# Patient Record
Sex: MALE | Race: WHITE | NOT HISPANIC OR LATINO | URBAN - METROPOLITAN AREA
[De-identification: names, ages, dates, MRNs, and addresses within clinical notes are randomized per-mention and may not be internally consistent; named-entity substitution may affect disease eponyms.]

---

## 2021-04-07 ENCOUNTER — NURSE TRIAGE (OUTPATIENT)
Dept: OTHER | Facility: OTHER | Age: 61
End: 2021-04-07

## 2021-04-07 DIAGNOSIS — Z20.822 EXPOSURE TO COVID-19 VIRUS: ICD-10-CM

## 2021-04-07 DIAGNOSIS — Z20.822 EXPOSURE TO COVID-19 VIRUS: Primary | ICD-10-CM

## 2021-04-07 PROCEDURE — U0005 INFEC AGEN DETEC AMPLI PROBE: HCPCS | Performed by: FAMILY MEDICINE

## 2021-04-07 PROCEDURE — U0003 INFECTIOUS AGENT DETECTION BY NUCLEIC ACID (DNA OR RNA); SEVERE ACUTE RESPIRATORY SYNDROME CORONAVIRUS 2 (SARS-COV-2) (CORONAVIRUS DISEASE [COVID-19]), AMPLIFIED PROBE TECHNIQUE, MAKING USE OF HIGH THROUGHPUT TECHNOLOGIES AS DESCRIBED BY CMS-2020-01-R: HCPCS | Performed by: FAMILY MEDICINE

## 2021-04-07 NOTE — TELEPHONE ENCOUNTER
Reason for Disposition   [1] COVID-19 infection suspected by caller or triager AND [2] mild symptoms (cough, fever, or others) AND [9] no complications or SOB    Answer Assessment - Initial Assessment Questions  1  Were you within 6 feet or less, for up to 15 minutes or more with a person that has a confirmed COVID-19 test?   Denies     2  What was the date of your exposure? Denies     3  Are you experiencing any symptoms attributed to the virus?  (Assess for SOB, cough, fever, difficulty breathing)   Fatigue, fever 100 this morning, body aches, dry and sore throat, upset stomach      4  HIGH RISK: Do you have any history heart or lung conditions, weakened immune system, diabetes, Asthma, CHF, HIV, COPD, Chemo, renal failure, sickle cell, etc?   [place]ipass, elevated BP    Protocols used: CORONAVIRUS (COVID-19) DIAGNOSED OR SUSPECTED-ADULT-[place]

## 2021-04-07 NOTE — TELEPHONE ENCOUNTER
Regarding: Covid Test  Symptomatic   ----- Message from Octavio Knapp sent at 4/7/2021  3:32 PM EDT -----  Pt called experiencing COVID like symptoms  " I am calling so I can get tested for COVID  I was not exposed to anyone that has COVID but I do have symptoms  I am feeling very fatigue, tired, and sore   My temperature was on 100 this morning, but has decreased"

## 2021-04-08 LAB — SARS-COV-2 RNA RESP QL NAA+PROBE: NEGATIVE

## 2021-05-10 ENCOUNTER — TELEPHONE (OUTPATIENT)
Dept: SURGERY | Facility: CLINIC | Age: 61
End: 2021-05-10

## 2021-05-10 DIAGNOSIS — G89.29 CHRONIC BILATERAL LOW BACK PAIN, UNSPECIFIED WHETHER SCIATICA PRESENT: Primary | ICD-10-CM

## 2021-05-10 DIAGNOSIS — M54.50 CHRONIC BILATERAL LOW BACK PAIN, UNSPECIFIED WHETHER SCIATICA PRESENT: Primary | ICD-10-CM

## 2021-05-10 NOTE — TELEPHONE ENCOUNTER
Andrea was scheduled for 5/17/21 with Kiana and is aware  Xray is in the chart. Andrea [place] out so he will come in for his x ray at 1pm for [facility] and check in on 5th floor as soon as its completed

## 2021-05-10 NOTE — TELEPHONE ENCOUNTER
"  Did not schedule because not sure if Dr Luong what's to send him for a MRI or xray  Or if ok to set with Kiana. Please advise I will call him back         Who referred you to our office?  Answer: prior patient    Have you had spine surgery, including a spinal cord stimulator?  a. No >> move on to next question  b. Yes, with Dr. Luong >> move on to the next question  c. Yes, with another surgeon >> \"Since you have had prior surgery, the office requires you to drop off or mail in a recent (post-surgery) MRI or CT scan.  Dr. Luong will review the imaging study to determine if he is able to offer surgical options.  You are not guaranteed an appointment as Dr. Luong may not have a surgical solution for you; however, I am going to ask you a few more questions to get a better idea of how we can help you.\"  i. Kiana will not order images for these patients - they must get imaging from another provider (their prior surgeon, PCP, pain management, etc.)  Answer: ye sin 2019 by Dr Luong  Fusion for L3,4 and 5      Is your pain related to a worker's compensation injury or a recent (within 2 years) auto accident?  d. No or not a recent accident >> move on to the next question.  e. Yes >> Unfortunately, Dr. Luong does not accept these insurances.   i. No need to fill out additional questions, but please forward us the encounter with the above information.  Answer: no      Where is your pain located (neck (cervical), mid (thoracic), or low back (lumbar))?  f. If the patient has multiple areas of pain, please explain that Dr. Luong will only be able to focus on ONE area per visit  Answer:  Mid to lower back       Have you completed an MRI or CT scan of your spine within the last 18 months?  g. If yes, \"please bring the discs and reports of these images to your appointment.\" SCHEDULE WITH DR. LUONG  h. If no, \"I can schedule you an appointment with Kiana, Dr. Luong's physician assistant.  She evaluates patients who " "do not have MRIs or CTs to help start a treatment plan. If after your initial visit with Kiana, it is appropriate to see Dr. Luong, we will schedule accordingly.\" SCHEDULE WITH KIANA (make appointment length 60 minutes)  Answer: no      Have you completed x-rays of your spine within the last year?  i. If yes, please bring the discs and reports of these images to your appointment.  j. If no, the office will order an x-ray for you to complete prior to your visit.  Are you able to complete x-rays in [facility]? If not, please find out where they will be going so that we can fax the script there.  Remind them to bring the disc of their images!!   i. If patients need to get x-rays done prior to their appointment, book them out 7-10 days so they have time to get the images.    Answer: no    Does your pain radiate?   Answer: hips an legs     Do you have numbness or tingling?  Answer: yes    Do you have weakness?  Answer: yes    Have you completed physical therapy?  Answer:  Yrs still doing pt    Have you completed spinal injections?  Answer:no    ** Please forward all completed intake questions to [facility] Provider Pool.   ** After scheduling the patient, please remind patients that Dr. Luong cannot prescribe narcotic pain medication or perform epidural injections.   ** New patients must arrive 30 minutes prior to appt and follow-ups arrive 15 min prior for appt.  ** If you do not schedule a patient for an appointment, please give a reason for not scheduling!!    **In summary:  - No MRI or CT >> Kiana  - Has MRI or CT and NO prior surgery >> Dr. Luong  - Prior surgery with another surgeon >> mail or drop off recent images and we will get back to them    "

## 2021-05-17 ENCOUNTER — HOSPITAL ENCOUNTER (OUTPATIENT)
Dept: RADIOLOGY | Facility: CLINIC | Age: 61
Discharge: HOME | End: 2021-05-17
Attending: ORTHOPAEDIC SURGERY
Payer: COMMERCIAL

## 2021-05-17 ENCOUNTER — OFFICE VISIT (OUTPATIENT)
Dept: SURGERY | Facility: CLINIC | Age: 61
End: 2021-05-17
Payer: COMMERCIAL

## 2021-05-17 VITALS
WEIGHT: 245 LBS | SYSTOLIC BLOOD PRESSURE: 154 MMHG | DIASTOLIC BLOOD PRESSURE: 96 MMHG | HEIGHT: 64 IN | TEMPERATURE: 98 F | BODY MASS INDEX: 41.83 KG/M2 | HEART RATE: 76 BPM | OXYGEN SATURATION: 95 % | RESPIRATION RATE: 20 BRPM

## 2021-05-17 DIAGNOSIS — M48.062 LUMBAR STENOSIS WITH NEUROGENIC CLAUDICATION: Primary | ICD-10-CM

## 2021-05-17 DIAGNOSIS — G89.29 CHRONIC BILATERAL LOW BACK PAIN, UNSPECIFIED WHETHER SCIATICA PRESENT: ICD-10-CM

## 2021-05-17 DIAGNOSIS — M54.50 CHRONIC BILATERAL LOW BACK PAIN, UNSPECIFIED WHETHER SCIATICA PRESENT: ICD-10-CM

## 2021-05-17 DIAGNOSIS — Z98.1 S/P LUMBAR FUSION: ICD-10-CM

## 2021-05-17 PROBLEM — G47.33 OSA ON CPAP: Status: ACTIVE | Noted: 2019-12-02

## 2021-05-17 PROBLEM — E78.2 MIXED HYPERLIPIDEMIA: Status: ACTIVE | Noted: 2019-11-14

## 2021-05-17 PROBLEM — I25.10 CORONARY ARTERY DISEASE INVOLVING NATIVE CORONARY ARTERY OF NATIVE HEART WITHOUT ANGINA PECTORIS: Status: ACTIVE | Noted: 2017-07-07

## 2021-05-17 PROBLEM — Z95.1 S/P CABG X 4: Status: ACTIVE | Noted: 2019-11-14

## 2021-05-17 PROBLEM — I73.9 PAD (PERIPHERAL ARTERY DISEASE) (CMS/HCC): Status: ACTIVE | Noted: 2018-11-06

## 2021-05-17 PROBLEM — N18.30 CKD (CHRONIC KIDNEY DISEASE) STAGE 3, GFR 30-59 ML/MIN (CMS/HCC): Status: ACTIVE | Noted: 2019-12-05

## 2021-05-17 PROCEDURE — 99214 OFFICE O/P EST MOD 30 MIN: CPT | Performed by: PHYSICIAN ASSISTANT

## 2021-05-17 PROCEDURE — 3008F BODY MASS INDEX DOCD: CPT | Performed by: PHYSICIAN ASSISTANT

## 2021-05-17 PROCEDURE — 72100 X-RAY EXAM L-S SPINE 2/3 VWS: CPT

## 2021-05-17 RX ORDER — OMEPRAZOLE 40 MG/1
40 CAPSULE, DELAYED RELEASE ORAL
COMMUNITY

## 2021-05-17 RX ORDER — FLUOXETINE 10 MG/1
CAPSULE ORAL
COMMUNITY
Start: 2021-02-10

## 2021-05-17 RX ORDER — IBUPROFEN 100 MG/5ML
SUSPENSION, ORAL (FINAL DOSE FORM) ORAL
COMMUNITY

## 2021-05-17 RX ORDER — METOPROLOL TARTRATE 50 MG/1
TABLET ORAL
COMMUNITY
Start: 2021-05-04

## 2021-05-17 RX ORDER — EVOLOCUMAB 140 MG/ML
140 INJECTION, SOLUTION SUBCUTANEOUS
COMMUNITY
Start: 2021-05-03

## 2021-05-17 RX ORDER — METHYLPREDNISOLONE 4 MG/1
TABLET ORAL
Qty: 21 TABLET | Refills: 0 | Status: SHIPPED | OUTPATIENT
Start: 2021-05-17 | End: 2021-11-04

## 2021-05-17 RX ORDER — CETIRIZINE HYDROCHLORIDE 10 MG/1
10 TABLET ORAL
COMMUNITY

## 2021-05-17 RX ORDER — LORAZEPAM 0.5 MG/1
0.5 TABLET ORAL
COMMUNITY

## 2021-05-17 RX ORDER — SODIUM FLUORIDE 6.1 MG/ML
GEL, DENTIFRICE DENTAL
COMMUNITY
Start: 2021-05-04

## 2021-05-17 RX ORDER — LISINOPRIL AND HYDROCHLOROTHIAZIDE 20; 25 MG/1; MG/1
TABLET ORAL
COMMUNITY
Start: 2021-05-04

## 2021-05-17 RX ORDER — AZELASTINE HYDROCHLORIDE, FLUTICASONE PROPIONATE 137; 50 UG/1; UG/1
SPRAY, METERED NASAL
COMMUNITY
Start: 2021-02-10

## 2021-05-17 RX ORDER — ASPIRIN 81 MG/1
81 TABLET ORAL DAILY
COMMUNITY

## 2021-05-17 NOTE — LETTER
May 17, 2021     Luis Fallon Jr., MD  [address] [facility] [address]    Patient: Andrea Villegas  YOB: 1960  Date of Visit: 2021      Dear Dr. Fallon:    Thank you for referring Andrea Villegas to me for evaluation. Below are my notes for this consultation.    If you have questions, please do not hesitate to call me. I look forward to following your patient along with you.         Sincerely,        SUDHA Dunne        CC: No Recipients  Kiana Hernandez PA C  2021  2:55 PM  Signed  NAME: Andrea Villegas  : 1960  PCP: Luis Fallon Jr., MD      Chief Complaint: back and leg pain    HPI:  60 y.o. male presenting for initial visit with chief complaint of back and leg pain.  Pain began months ago. Describes pain as aching and throbbing in nature.  Located in low back, bilateral buttocks, posterior thighs to bilateral calves and feet.  There is associated numbness and tingling in his legs.  Back pain 50%, Leg pain 50%.   Pain is worse with activity, walking, standing and improves with rest, NSAIDs, Tylenol.    He has not completed injections since surgery.  He has been attending physical therapy for multiple months and continues with home exercises.     Denies any paulo trauma. Denies fever or chills. Denies any bladder or bowel changes.      PAST MEDICAL HISTORY:   Past Medical History:   Diagnosis Date   • Hypertension        MEDICATIONS:  Current Outpatient Medications on File Prior to Visit   Medication Sig Dispense Refill   • evolocumab (REPATHA SURECLICK) 140 mg/mL pen Inject 140 mg under the skin.     • FLUoxetine (PROzac) 10 mg capsule      • ascorbic acid, vitamin C, (VITAMIN C) 1,000 mg tablet      • aspirin 81 mg enteric coated tablet Take 81 mg by mouth daily.     • azelastine-fluticasone (DYMISTA) 137-50 mcg/spray nasal spray      • cetirizine (ZyrTEC) 10 mg tablet Take 10 mg by mouth.     • lactobacillus comb no.10 20 billion  cell capsule      • lisinopriL-hydrochlorothiazide (PRINZIDE,ZESTORETIC) 20-25 mg per tablet      • LORazepam (ATIVAN) 0.5 mg tablet Take 0.5 mg by mouth.     • metoprolol tartrate (LOPRESSOR) 50 mg tablet      • multivitamin-minerals-lutein tablet      • omeprazole (PriLOSEC) 40 mg capsule Take 40 mg by mouth.     • PREVIDENT 5000 DRY MOUTH 1.1 % gel        No current facility-administered medications on file prior to visit.       PAST SURGICAL HISTORY:  Past Surgical History:   Procedure Laterality Date   • NECK SURGERY     • SPINE SURGERY         SOCIAL HISTORY:  Social History     Socioeconomic History   • Marital status:      Spouse name: Not on file   • Number of children: Not on file   • Years of education: Not on file   • Highest education level: Not on file   Occupational History   • Not on file   Tobacco Use   • Smoking status: Former Smoker   • Smokeless tobacco: Never Used   Substance and Sexual Activity   • Alcohol use: Not on file   • Drug use: Not on file   • Sexual activity: Not on file   Other Topics Concern   • Not on file   Social History Narrative   • Not on file     Social Determinants of Health     Financial Resource Strain:    • Difficulty of Paying Living Expenses:    Food Insecurity:    • Worried About Running Out of Food in the Last Year:    • Ran Out of Food in the Last Year:    Transportation Needs:    • Lack of Transportation (Medical):    • Lack of Transportation (Non-Medical):    Physical Activity:    • Days of Exercise per Week:    • Minutes of Exercise per Session:    Stress:    • Feeling of Stress :    Social Connections:    • Frequency of Communication with Friends and Family:    • Frequency of Social Gatherings with Friends and Family:    • Attends [employer] Services:    • Active Member of Clubs or Organizations:    • Attends Club or Organization Meetings:    • Marital Status:    Intimate Partner Violence:    • Fear of Current or Ex-Partner:    • Emotionally Abused:    •  "Physically Abused:    • Sexually Abused:        ALLERGIES:  No Known Allergies    ROS:   Constitutional:  No fever, chills, night sweats, decreased appetite   HEENT No change in vision, no difficulty hearing, no sore throat, no difficulty swallowing   Cardiovascular:  No chest pain, palpitations, heart murmur   Respiratory:  No SOB, coughing, wheezes/rales/rhonchi, chest discomfort or tightness (angina)   Gastrointestinal:  No nausea, vomiting, abdominal pain, or liver problems    Genitourinary:  No dysuria or incontinence    Musculoskeletal:  See HPI   Skin:  No rash or erythema   Neurologic:  See HPI   Psychiatric Illness:  No confusion   Hematological/   Lymphatic:  No abnormal bleeding or swollen lymph nodes.    Allergic/Immunologic:  No hay fever and Lupus.      PHYSICAL EXAM:  Visit Vitals  BP (!) 154/96 (BP Location: Left upper arm, Patient Position: Sitting)   Pulse 76   Temp 36.7 °C (98 °F) (Oral)   Resp 20   Ht 1.626 m (5' 4\")   Wt 111 kg (245 lb)   SpO2 95%   BMI 42.05 kg/m²       Pain 8 / 10  General:  Well-developed,appears well, no acute distress   HEENT Normocephalic. Sclera nonicteric. Negative for masses, asymmetry and tracheal deviation.    Respiratory:  No SOB, no abnormal effort (use of accessory muscles).    CV:  Pulses regular rate.  All extremities warm with brisk capillary refill.   Neurologic:  Alert and oriented to person, place and time.    GI / Abdominal:  Soft. No abdominal masses or tenderness. Nondistended.    Gait & balance No evidence of myelopathic gait.      Lumbar spine range of motion:  -Forward flexion to 90  -Extension to neutral  -Lateral bend 45 right, 45 left  -Rotation 45 right, 45 lef    There is mild point tenderness with palpation along the bilateral SI joints (L>R).    Neurologic:    Lower Extremity Motor Function    Right  Left    Iliopsoas  5/5  5/5    Quadriceps 5/5 5/5   Tibialis anterior  5/5  5/5    EHL  5/5  5/5    Gastroc. muscle  5/5  5/5            "     Sensory: light touch is intact to bilateral upper and lower extremities   Reflexes:    Right Left   Patellar 1+ 1+   Achilles 0 0          Other tests:  Straight Leg Raise: Negative     Hip Exam:   No pain with flexion, adduction and internal rotation bilaterally.      IMAGING: I have personally reviewed the images and these are my findings:  XR lumbar: Images of the lumbar spine reveal stable interbody devices at L3/4, L4/5, and L5/S1 with posterior instrumentation from L3 to S1.  There is moderate to severe disc space narrowing at L2/3. There is some degenerative changes noted in the right greater than left sacroiliac joint. There is no evidence of instability, fractures, or osseous destructive lesions.      ASSESSMENT/PLAN:  Mr. Villegas is a very pleasant 60 year old gentleman who is known to Dr. Luong and myself.  He completed an L3/4 lateral lumbar interbody fusion, L4-S1 anterior lumbar interbody fusion, and L3-S1 posterior fusion with instrumentation in December 2019. He was doing well for a few months following surgery; however, after returning back to work and being more active, he has noticed an increase in low back, buttock, and bilateral leg pain.  His symptoms are worse with standing, walking, and activity.  They improve with rest and stretching with physical therapy (limited improvement).  He is unable to take NSAIDs due to fluid retention.  He takes tylenol #3 at night to help with pain.  He has been going to therapy since September 2020 (now attending once per week) and performs home exercises daily.  He demonstrated tenderness with palpation of bilateral sacroiliac joints (L>R).  He has full strength and sensation in his legs at today's visit.  His x-ray revealed stable instrumentation from L3 to S1 with degenerative changes at L2/3. His pain is likely multifactorial in nature.  I will order an MRI of his lumbar spine to assess for new nerve compression given his neurogenic symptoms. He will  mail it to our office and we will call him with the results and a treatment plan.  I provided him with a script for a medrol dose pack to help with inflammation. He will check with this opthalmologist prior to taking this medication.    Kiana Hernandez PA-C

## 2021-05-17 NOTE — PROGRESS NOTES
NAME: Andrea Villegas  : 1960  PCP: Luis Fallon Jr., MD      Chief Complaint: back and leg pain    HPI:  60 y.o. male presenting for initial visit with chief complaint of back and leg pain.  Pain began months ago. Describes pain as aching and throbbing in nature.  Located in low back, bilateral buttocks, posterior thighs to bilateral calves and feet.  There is associated numbness and tingling in his legs.  Back pain 50%, Leg pain 50%.   Pain is worse with activity, walking, standing and improves with rest, NSAIDs, Tylenol.    He has not completed injections since surgery.  He has been attending physical therapy for multiple months and continues with home exercises.     Denies any paulo trauma. Denies fever or chills. Denies any bladder or bowel changes.      PAST MEDICAL HISTORY:   Past Medical History:   Diagnosis Date   • Hypertension        MEDICATIONS:  Current Outpatient Medications on File Prior to Visit   Medication Sig Dispense Refill   • evolocumab (REPATHA SURECLICK) 140 mg/mL pen Inject 140 mg under the skin.     • FLUoxetine (PROzac) 10 mg capsule      • ascorbic acid, vitamin C, (VITAMIN C) 1,000 mg tablet      • aspirin 81 mg enteric coated tablet Take 81 mg by mouth daily.     • azelastine-fluticasone (DYMISTA) 137-50 mcg/spray nasal spray      • cetirizine (ZyrTEC) 10 mg tablet Take 10 mg by mouth.     • lactobacillus comb no.10 20 billion cell capsule      • lisinopriL-hydrochlorothiazide (PRINZIDE,ZESTORETIC) 20-25 mg per tablet      • LORazepam (ATIVAN) 0.5 mg tablet Take 0.5 mg by mouth.     • metoprolol tartrate (LOPRESSOR) 50 mg tablet      • multivitamin-minerals-lutein tablet      • omeprazole (PriLOSEC) 40 mg capsule Take 40 mg by mouth.     • PREVIDENT 5000 DRY MOUTH 1.1 % gel        No current facility-administered medications on file prior to visit.       PAST SURGICAL HISTORY:  Past Surgical History:   Procedure Laterality Date   • NECK SURGERY     • SPINE SURGERY          SOCIAL HISTORY:  Social History     Socioeconomic History   • Marital status:      Spouse name: Not on file   • Number of children: Not on file   • Years of education: Not on file   • Highest education level: Not on file   Occupational History   • Not on file   Tobacco Use   • Smoking status: Former Smoker   • Smokeless tobacco: Never Used   Substance and Sexual Activity   • Alcohol use: Not on file   • Drug use: Not on file   • Sexual activity: Not on file   Other Topics Concern   • Not on file   Social History Narrative   • Not on file     Social Determinants of Health     Financial Resource Strain:    • Difficulty of Paying Living Expenses:    Food Insecurity:    • Worried About Running Out of Food in the Last Year:    • Ran Out of Food in the Last Year:    Transportation Needs:    • Lack of Transportation (Medical):    • Lack of Transportation (Non-Medical):    Physical Activity:    • Days of Exercise per Week:    • Minutes of Exercise per Session:    Stress:    • Feeling of Stress :    Social Connections:    • Frequency of Communication with Friends and Family:    • Frequency of Social Gatherings with Friends and Family:    • Attends [employer] Services:    • Active Member of Clubs or Organizations:    • Attends Club or Organization Meetings:    • Marital Status:    Intimate Partner Violence:    • Fear of Current or Ex-Partner:    • Emotionally Abused:    • Physically Abused:    • Sexually Abused:        ALLERGIES:  No Known Allergies    ROS:   Constitutional:  No fever, chills, night sweats, decreased appetite   HEENT No change in vision, no difficulty hearing, no sore throat, no difficulty swallowing   Cardiovascular:  No chest pain, palpitations, heart murmur   Respiratory:  No SOB, coughing, wheezes/rales/rhonchi, chest discomfort or tightness (angina)   Gastrointestinal:  No nausea, vomiting, abdominal pain, or liver problems    Genitourinary:  No dysuria or incontinence    Musculoskeletal:   "See HPI   Skin:  No rash or erythema   Neurologic:  See HPI   Psychiatric Illness:  No confusion   Hematological/   Lymphatic:  No abnormal bleeding or swollen lymph nodes.    Allergic/Immunologic:  No hay fever and Lupus.      PHYSICAL EXAM:  Visit Vitals  BP (!) 154/96 (BP Location: Left upper arm, Patient Position: Sitting)   Pulse 76   Temp 36.7 °C (98 °F) (Oral)   Resp 20   Ht 1.626 m (5' 4\")   Wt 111 kg (245 lb)   SpO2 95%   BMI 42.05 kg/m²       Pain 8 / 10  General:  Well-developed,appears well, no acute distress   HEENT Normocephalic. Sclera nonicteric. Negative for masses, asymmetry and tracheal deviation.    Respiratory:  No SOB, no abnormal effort (use of accessory muscles).    CV:  Pulses regular rate.  All extremities warm with brisk capillary refill.   Neurologic:  Alert and oriented to person, place and time.    GI / Abdominal:  Soft. No abdominal masses or tenderness. Nondistended.    Gait & balance No evidence of myelopathic gait.      Lumbar spine range of motion:  -Forward flexion to 90  -Extension to neutral  -Lateral bend 45 right, 45 left  -Rotation 45 right, 45 lef    There is mild point tenderness with palpation along the bilateral SI joints (L>R).    Neurologic:    Lower Extremity Motor Function    Right  Left    Iliopsoas  5/5  5/5    Quadriceps 5/5 5/5   Tibialis anterior  5/5  5/5    EHL  5/5  5/5    Gastroc. muscle  5/5  5/5                Sensory: light touch is intact to bilateral upper and lower extremities   Reflexes:    Right Left   Patellar 1+ 1+   Achilles 0 0          Other tests:  Straight Leg Raise: Negative     Hip Exam:   No pain with flexion, adduction and internal rotation bilaterally.      IMAGING: I have personally reviewed the images and these are my findings:  XR lumbar: Images of the lumbar spine reveal stable interbody devices at L3/4, L4/5, and L5/S1 with posterior instrumentation from L3 to S1.  There is moderate to severe disc space narrowing at L2/3. There is " some degenerative changes noted in the right greater than left sacroiliac joint. There is no evidence of instability, fractures, or osseous destructive lesions.      ASSESSMENT/PLAN:  Mr. Villegas is a very pleasant 60 year old gentleman who is known to Dr. Luong and myself.  He completed an L3/4 lateral lumbar interbody fusion, L4-S1 anterior lumbar interbody fusion, and L3-S1 posterior fusion with instrumentation in December 2019. He was doing well for a few months following surgery; however, after returning back to work and being more active, he has noticed an increase in low back, buttock, and bilateral leg pain.  His symptoms are worse with standing, walking, and activity.  They improve with rest and stretching with physical therapy (limited improvement).  He is unable to take NSAIDs due to fluid retention.  He takes tylenol #3 at night to help with pain.  He has been going to therapy since September 2020 (now attending once per week) and performs home exercises daily.  He demonstrated tenderness with palpation of bilateral sacroiliac joints (L>R).  He has full strength and sensation in his legs at today's visit.  His x-ray revealed stable instrumentation from L3 to S1 with degenerative changes at L2/3. His pain is likely multifactorial in nature.  I will order an MRI of his lumbar spine to assess for new nerve compression given his neurogenic symptoms. He will mail it to our office and we will call him with the results and a treatment plan.  I provided him with a script for a medrol dose pack to help with inflammation. He will check with this opthalmologist prior to taking this medication.    Kiana Hernandez PA-C

## 2021-05-18 ENCOUNTER — TELEPHONE (OUTPATIENT)
Dept: SURGERY | Facility: CLINIC | Age: 61
End: 2021-05-18

## 2021-05-18 NOTE — TELEPHONE ENCOUNTER
Called Andrea and left detailed message. Called [facility] and spoke to rep Caroline Mendez does not require pre cert for MRI.   Ref # I (as in igloo) [id number]       ref# [id number]    Faxing script and ref# over to [facility] for Andrea

## 2021-05-18 NOTE — TELEPHONE ENCOUNTER
----- Message from SUDHA Zepeda sent at 5/17/2021  1:58 PM EDT -----  Yoko,     Can you authorize an MRI lumbar spine to the below facility?  He has had prior surgery with us and he has been attending physical therapy without relief of his symptoms.      [address] [place] [address]   Phone: [phone]  Fax: [phone]      He will mail us the disc when he completes it.    Thanks,   Kiana

## 2021-06-08 ENCOUNTER — TELEPHONE (OUTPATIENT)
Dept: SURGERY | Facility: CLINIC | Age: 61
End: 2021-06-08

## 2021-06-08 DIAGNOSIS — Z98.1 S/P LUMBAR FUSION: Primary | ICD-10-CM

## 2021-06-08 DIAGNOSIS — M48.062 LUMBAR STENOSIS WITH NEUROGENIC CLAUDICATION: ICD-10-CM

## 2021-06-08 NOTE — TELEPHONE ENCOUNTER
Patient states he mailed MRI to [facility] office. Patient wants to know if it was received and if he can be notified once Dr. Luong has reviewed.

## 2021-06-10 NOTE — TELEPHONE ENCOUNTER
LM for patient to call back with a convenient time to discuss his MRI results and treatment options.

## 2021-06-11 ENCOUNTER — TELEPHONE (OUTPATIENT)
Dept: SURGERY | Facility: CLINIC | Age: 61
End: 2021-06-11

## 2021-06-11 NOTE — TELEPHONE ENCOUNTER
Left message on voicemail with MRI results and our suggestions for treatment.  MRI shows disc bulge without severe compression of nerves at L2/3.  We do not recommend surgery at this time. We recommend an NACHO to control symptoms.  Asked him to let us know where he completes his injections so that we can fax our recommendation to that office.  We will try to coordinate a better time to chat further.

## 2021-06-11 NOTE — TELEPHONE ENCOUNTER
Spoke with patient - explained MRI results and our recommendation to get an injection.  He will check with his PCP about an injection specialist in his area.  When he finds someone, he will call us back with their information so that we can fax the injection recommendation to their office.  He will likely call back next week.

## 2021-06-14 NOTE — TELEPHONE ENCOUNTER
Pt requests the injection recommendation  Faxed to [facility] Fax # [phone]  Office phone # [phone]

## 2021-06-15 NOTE — TELEPHONE ENCOUNTER
Faxed RX to [facility] at [facility]

## 2021-07-02 ENCOUNTER — TELEPHONE (OUTPATIENT)
Dept: SURGERY | Facility: CLINIC | Age: 61
End: 2021-07-02

## 2021-07-02 NOTE — TELEPHONE ENCOUNTER
Brennen has had his pain managment appt. But they only did a consultation they can not do the epidural shot until 08/25th, he would prefer not to wait that long. Would like a call back to discuss.

## 2021-07-14 ENCOUNTER — TELEPHONE (OUTPATIENT)
Dept: SURGERY | Facility: CLINIC | Age: 61
End: 2021-07-14

## 2021-07-14 NOTE — TELEPHONE ENCOUNTER
Left a detailed message on pt voicemail giving him the number to [facility] to call for a possible appt.

## 2021-11-04 ENCOUNTER — TELEPHONE (OUTPATIENT)
Dept: SURGERY | Facility: CLINIC | Age: 61
End: 2021-11-04

## 2021-11-04 NOTE — TELEPHONE ENCOUNTER
Torsten called would maren for verenice to give him a prsahant back when she get time. He wants to dicuss his progress and what has been going on . He ha to have his hip redone  The socket an ball wore out from the first operation 15 years ago. He believes the pain he was having was due to a glutinous muscle ripped

## 2021-11-04 NOTE — TELEPHONE ENCOUNTER
Spoke with patient. He had a revision hip replacement with gluteal tear repair on 10/5.  His buttock and leg pain has been significantly better since his surgery. He will let us know if things change as he progresses through therapy.

## 2021-11-04 NOTE — TELEPHONE ENCOUNTER
LM for patient to call back to discuss his questions.  Explained that we will be in the OR tomorrow so we may not be able to talk until Monday or Tuesday.